# Patient Record
Sex: FEMALE | Race: WHITE
[De-identification: names, ages, dates, MRNs, and addresses within clinical notes are randomized per-mention and may not be internally consistent; named-entity substitution may affect disease eponyms.]

---

## 2017-04-26 ENCOUNTER — HOSPITAL ENCOUNTER (OUTPATIENT)
Dept: HOSPITAL 45 - C.ULTR | Age: 77
Discharge: HOME | End: 2017-04-26
Attending: SURGERY
Payer: COMMERCIAL

## 2017-04-26 DIAGNOSIS — K46.9: Primary | ICD-10-CM

## 2017-04-26 NOTE — DIAGNOSTIC IMAGING REPORT
ABDOMINAL WALL ULTRASOUND



CLINICAL HISTORY: SUSPECTED SPEGELIAN HERNIA    



COMPARISON STUDY:  No previous studies for comparison.



FINDINGS: Ultrasonographic evaluation of the right abdominal wall with attention

to the area of clinical concern was performed. This is performed both in the

supine and standing positions. No hernia is visualized. It should be noted that

CT scanning would be more sensitive for the detection of a spegelian hernia.



IMPRESSION:  No hernia is visualized ultrasonographically. 









Electronically signed by:  Herbert Hebert M.D.

4/26/2017 11:22 AM



Dictated Date/Time:  4/26/2017 11:21 AM

## 2017-05-05 ENCOUNTER — HOSPITAL ENCOUNTER (OUTPATIENT)
Dept: HOSPITAL 45 - C.CTS | Age: 77
Discharge: HOME | End: 2017-05-05
Attending: PHYSICIAN ASSISTANT
Payer: COMMERCIAL

## 2017-05-05 DIAGNOSIS — K46.9: Primary | ICD-10-CM

## 2017-05-05 NOTE — DIAGNOSTIC IMAGING REPORT
ABDOMEN AND PELVIS CT WITH IV AND ORAL CONTRAST



CT DOSE: 371.64 mGy.cm



HISTORY: Pain  K46.9 Hernia Possible spigelian hernia.[id number]



TECHNIQUE: Multiaxial CT images of the abdomen and pelvis were performed

following the use of intravenous and oral contrast.



COMPARISON STUDY: None.



FINDINGS: The lung bases are clear. The liver, spleen, gallbladder, pancreas,

kidneys, and adrenal glands are within normal limits. No bowel wall thickening

or obstruction. The pelvic organs are unremarkable. No suspicious lytic or

blastic osseous lesions.



IMPRESSION: 

No significant abnormality identified within the abdomen or pelvis. 







Electronically signed by:  Montrell Tucker M.D.

5/5/2017 2:57 PM



Dictated Date/Time:  5/5/2017 2:53 PM

## 2017-05-25 ENCOUNTER — HOSPITAL ENCOUNTER (OUTPATIENT)
Dept: HOSPITAL 45 - C.RAD | Age: 77
Discharge: HOME | End: 2017-05-25
Attending: NURSE PRACTITIONER
Payer: COMMERCIAL

## 2017-05-25 DIAGNOSIS — M25.651: Primary | ICD-10-CM

## 2017-05-25 DIAGNOSIS — M16.0: ICD-10-CM

## 2017-05-25 NOTE — DIAGNOSTIC IMAGING REPORT
LEFT HIP 2 VIEWS



HISTORY:      Left hip pain.



COMPARISON: None.



FINDINGS: There is no fracture or dislocation. Soft tissues are unremarkable.

Moderate to severe cartilage space narrowing within the left hip. There are

marginal osteophytes within the acetabulum and femoral head. There is associated

subchondral sclerosis.



IMPRESSION:  

Moderate to severe left hip osteoarthritis. No fractures.







Electronically signed by:  Kike Rivera M.D.

5/25/2017 2:58 PM



Dictated Date/Time:  5/25/2017 2:57 PM

## 2017-05-25 NOTE — DIAGNOSTIC IMAGING REPORT
RIGHT HIP UNILATERAL 2 VIEWS



CLINICAL HISTORY: Significantly decreased range of motion of both hips.    



COMPARISON: CT of the abdomen and pelvis May 5, 2017.



FINDINGS:  Alignment of the right hip is anatomic. There is moderate to severe

joint space narrowing of the right hip with extensive osteophytosis and

subchondral sclerosis. There is no evidence for avascular necrosis. There is no

fracture or suspicious lesion.



IMPRESSION: Severe osteoarthritis of the right hip.







Electronically signed by:  Perez Ramos M.D.

5/25/2017 2:57 PM



Dictated Date/Time:  5/25/2017 2:56 PM

## 2017-08-28 ENCOUNTER — HOSPITAL ENCOUNTER (OUTPATIENT)
Dept: HOSPITAL 45 - C.RADBC | Age: 77
Discharge: HOME | End: 2017-08-28
Attending: ORTHOPAEDIC SURGERY
Payer: COMMERCIAL

## 2017-08-28 DIAGNOSIS — M16.12: Primary | ICD-10-CM

## 2017-08-28 NOTE — DIAGNOSTIC IMAGING REPORT
FLUOROSCOPICALLY GUIDED INTRA-ARTICULAR LEFT HIP STEROID AND ANESTHETIC

INJECTION



CLINICAL HISTORY: Left hip and knee pain   



COMPARISON STUDY:  Outside radiograph dated 8/22/2017



FLUOROSCOPY TIME: 28 seconds.



NUMBER OF FLUOROSCOPIC IMAGES:  1



FINDINGS: A timeout was performed.



The risks the procedure were explained the patient informed consent was

obtained.



The patient was prepped and draped in sterile fashion.



The skin was anesthetized with 1% lidocaine.



Under fluoroscopic guidance, 20-gauge spinal needle was introduced into the

joint capsule left hip.



Intra-articular location was documented via the injection of Optiray 300.



2 cc of Celestone and 8 cc of Marcaine were instilled intra-articularly.



IMPRESSION:  

1. 2 cc of Celestone and 8 cc of Marcaine were intra-articular injected into the

left hip. There were no immediate complications .







Electronically signed by:  Herbert Hebert M.D.

8/28/2017 12:05 PM



Dictated Date/Time:  8/28/2017 12:03 PM

## 2017-09-28 LAB
ANION GAP SERPL CALC-SCNC: 8 MMOL/L (ref 3–11)
APPEARANCE UR: (no result)
BASOPHILS # BLD: 0.04 K/UL (ref 0–0.2)
BASOPHILS NFR BLD: 0.6 %
BILIRUB UR-MCNC: (no result) MG/DL
BUN SERPL-MCNC: 17 MG/DL (ref 7–18)
BUN/CREAT SERPL: 21.5 (ref 10–20)
CALCIUM SERPL-MCNC: 9.4 MG/DL (ref 8.5–10.1)
CHLORIDE SERPL-SCNC: 105 MMOL/L (ref 98–107)
CO2 SERPL-SCNC: 27 MMOL/L (ref 21–32)
COLOR UR: (no result)
COMPLETE: YES
CREAT CL PREDICTED SERPL C-G-VRATE: 68.9 ML/MIN
CREAT SERPL-MCNC: 0.81 MG/DL (ref 0.6–1.2)
EOSINOPHIL NFR BLD AUTO: 187 K/UL (ref 130–400)
GLUCOSE SERPL-MCNC: 103 MG/DL (ref 70–99)
HCT VFR BLD CALC: 42.2 % (ref 37–47)
IG%: 0.3 %
IMM GRANULOCYTES NFR BLD AUTO: 30.1 %
INR PPP: 1 (ref 0.9–1.1)
LYMPHOCYTES # BLD: 2.08 K/UL (ref 1.2–3.4)
MANUAL MICROSCOPIC REQUIRED?: NO
MCH RBC QN AUTO: 30.4 PG (ref 25–34)
MCHC RBC AUTO-ENTMCNC: 32.7 G/DL (ref 32–36)
MCV RBC AUTO: 93 FL (ref 80–100)
MONOCYTES NFR BLD: 9.8 %
NEUTROPHILS # BLD AUTO: 5.9 %
NEUTROPHILS NFR BLD AUTO: 53.3 %
NITRITE UR QL STRIP: (no result)
PARTIAL THROMBOPLASTIN RATIO: 1
PH UR STRIP: 5 [PH] (ref 4.5–7.5)
PMV BLD AUTO: 11.4 FL (ref 7.4–10.4)
POTASSIUM SERPL-SCNC: 4.1 MMOL/L (ref 3.5–5.1)
PROTHROMBIN TIME: 11 SECONDS (ref 9–12)
RBC # BLD AUTO: 4.54 M/UL (ref 4.2–5.4)
REVIEW REQ?: YES
SODIUM SERPL-SCNC: 140 MMOL/L (ref 136–145)
SP GR UR STRIP: 1.03 (ref 1–1.03)
URINE BILL WITH OR WITHOUT MIC: (no result)
URINE EPITHELIAL CELL AUTO: >30 /LPF (ref 0–5)
UROBILINOGEN UR-MCNC: (no result) MG/DL
WBC # BLD AUTO: 6.92 K/UL (ref 4.8–10.8)

## 2017-09-28 NOTE — PAT MEDICATION INSTRUCTIONS
Service Date


Sep 28, 2017.





Current Home Medication List


Estradiol (Estradiol), 0.5 MG PO QAM


Multivitamin (Multivitamin), 1 TAB PO DAILY





Medication Instructions


For Your Scheduled Surgery 





- Hold the following medications the morning of surgery:


Multivitamin (Multivitamin), 1 TAB PO DAILY





- Take the following medications the morning of surgery with a sip of water 

OTHERWISE NOTHING TO EAT OR DRINK AFTER MIDNIGHT:


Estradiol (Estradiol), 0.5 MG PO QAM











If you have any questions please call us at [phone] or [phone] or 

[phone]

## 2017-09-28 NOTE — DIAGNOSTIC IMAGING REPORT
CHEST 2 VIEWS ROUTINE



CLINICAL HISTORY: pat preoperative evaluation



COMPARISON STUDY:  6/17/2016



FINDINGS: The bones soft tissues and hemidiaphragms are normal. The

cardiomediastinal silhouette is normal. The lungs are clear. The pulmonary

vasculature is normal. 



IMPRESSION:  Negative chest. 











The above report was generated using voice recognition software.  It may contain

grammatical, syntax or spelling errors.









Electronically signed by:  Montrell Tucker M.D.

9/28/2017 2:37 PM



Dictated Date/Time:  9/28/2017 2:37 PM

## 2017-10-19 NOTE — HISTORY & PHYSICAL EXAMINATION
DATE OF ADMISSION:  10/20/2017

 

HISTORY AND PHYSICAL ADMISSION NOTE

 

CHIEF COMPLAINT:  Primary osteoarthritis of the left hip.

 

HISTORY OF PRESENT ILLNESS:  Virginia is a pleasant 77-year-old female who

presented to my office with complaints of left hip and groin pain.  X-rays

and clinical examination were diagnostic for primary osteoarthritis of the

left hip.  After failing extensive conservative treatment, she elected to

proceed with a left total hip arthroplasty.

 

PAST MEDICAL HISTORY:  Essentially denies.

 

PAST SURGICAL HISTORY:  Significant for an appendectomy 45 years ago,

hysterectomy 4 years ago and a hernia repair.

 

ALLERGIES:  PENICILLIN, ADVIL AND ALEVE.

 

MEDICATIONS:  Include estradiol 0.5 mg daily, and a women's multivitamin.

 

FAMILY HISTORY:  Noncontributory.

 

SOCIAL HISTORY:  She denies any tobacco, alcohol or IV drug use.  She remains

active.

 

REVIEW OF SYSTEMS:  She complains of left hip and groin pain.  All other

pertinent review of systems is negative.

 

PHYSICAL EXAMINATION:

GENERAL:  She is awake, alert and oriented x3.  She is in no apparent

distress.  She is very pleasant.

HEENT:  Pupils equal, round and reactive to light.  Extraocular motion

intact.  Oral mucosa is pink and moist.

HEART:  Regular rate per radial pulse.

LUNGS:  Miriam symmetrically bilaterally with no audible breath sounds.

ABDOMEN:  Soft, nontender, nondistended.

MUSCULOSKELETAL:  On physical examination of her left hip, she has difficulty

lying flat.  I can flex her up to about 90 degrees and she has significant

pain with forced internal and external rotation of her hip.  Her leg lengths

are essentially equal and she is neurovascularly intact.

 

IMAGING DATA:  X-rays of the left hip and pelvis do show advanced

osteoarthritis of the left hip with joint space narrowing and osteophyte

formation.

 

IMPRESSION:  Primary osteoarthritis of the left hip.

 

PLAN:  Will proceed with a Biomet taper lock left total hip arthroplasty. 

Postoperatively, she will be started on aspirin for DVT prophylaxis and kept

in the hospital for postoperative medical management.  She will likely be

discharged to [facility].

 

 

 

BARRIE

## 2017-10-20 ENCOUNTER — HOSPITAL ENCOUNTER (INPATIENT)
Dept: HOSPITAL 45 - C.ACU | Age: 77
LOS: 2 days | Discharge: NURSING, REHAB OR LONG-TERM CARE | DRG: 470 | End: 2017-10-22
Attending: ORTHOPAEDIC SURGERY | Admitting: ORTHOPAEDIC SURGERY
Payer: COMMERCIAL

## 2017-10-20 VITALS
HEIGHT: 69 IN | WEIGHT: 175.49 LBS | BODY MASS INDEX: 25.99 KG/M2 | HEIGHT: 69 IN | BODY MASS INDEX: 25.99 KG/M2 | BODY MASS INDEX: 25.99 KG/M2 | WEIGHT: 175.49 LBS

## 2017-10-20 VITALS
HEART RATE: 73 BPM | SYSTOLIC BLOOD PRESSURE: 127 MMHG | TEMPERATURE: 97.52 F | OXYGEN SATURATION: 99 % | DIASTOLIC BLOOD PRESSURE: 72 MMHG

## 2017-10-20 VITALS — SYSTOLIC BLOOD PRESSURE: 132 MMHG | DIASTOLIC BLOOD PRESSURE: 81 MMHG | HEART RATE: 72 BPM | OXYGEN SATURATION: 100 %

## 2017-10-20 VITALS
HEART RATE: 71 BPM | DIASTOLIC BLOOD PRESSURE: 80 MMHG | SYSTOLIC BLOOD PRESSURE: 138 MMHG | OXYGEN SATURATION: 100 % | TEMPERATURE: 97.52 F

## 2017-10-20 VITALS
TEMPERATURE: 97.52 F | OXYGEN SATURATION: 99 % | DIASTOLIC BLOOD PRESSURE: 79 MMHG | SYSTOLIC BLOOD PRESSURE: 134 MMHG | HEART RATE: 66 BPM

## 2017-10-20 VITALS
OXYGEN SATURATION: 99 % | DIASTOLIC BLOOD PRESSURE: 80 MMHG | SYSTOLIC BLOOD PRESSURE: 130 MMHG | TEMPERATURE: 94.46 F | HEART RATE: 72 BPM

## 2017-10-20 VITALS
DIASTOLIC BLOOD PRESSURE: 63 MMHG | HEART RATE: 78 BPM | SYSTOLIC BLOOD PRESSURE: 107 MMHG | TEMPERATURE: 97.7 F | OXYGEN SATURATION: 97 %

## 2017-10-20 VITALS
SYSTOLIC BLOOD PRESSURE: 136 MMHG | OXYGEN SATURATION: 99 % | HEART RATE: 70 BPM | DIASTOLIC BLOOD PRESSURE: 85 MMHG | TEMPERATURE: 97.52 F

## 2017-10-20 VITALS
HEART RATE: 73 BPM | OXYGEN SATURATION: 98 % | DIASTOLIC BLOOD PRESSURE: 74 MMHG | TEMPERATURE: 97.7 F | SYSTOLIC BLOOD PRESSURE: 123 MMHG

## 2017-10-20 VITALS — DIASTOLIC BLOOD PRESSURE: 76 MMHG | HEART RATE: 68 BPM | SYSTOLIC BLOOD PRESSURE: 124 MMHG | OXYGEN SATURATION: 98 %

## 2017-10-20 VITALS
SYSTOLIC BLOOD PRESSURE: 119 MMHG | OXYGEN SATURATION: 96 % | TEMPERATURE: 98.06 F | DIASTOLIC BLOOD PRESSURE: 84 MMHG | HEART RATE: 68 BPM

## 2017-10-20 DIAGNOSIS — Z88.0: ICD-10-CM

## 2017-10-20 DIAGNOSIS — M16.12: Primary | ICD-10-CM

## 2017-10-20 LAB — HCT VFR BLD CALC: 30 % (ref 37–47)

## 2017-10-20 PROCEDURE — 0SRB04Z REPLACEMENT OF LEFT HIP JOINT WITH CERAMIC ON POLYETHYLENE SYNTHETIC SUBSTITUTE, OPEN APPROACH: ICD-10-PCS | Performed by: ORTHOPAEDIC SURGERY

## 2017-10-20 RX ADMIN — Medication SCH MG: at 21:18

## 2017-10-20 RX ADMIN — DOCUSATE SODIUM SCH MG: 100 CAPSULE, LIQUID FILLED ORAL at 21:18

## 2017-10-20 RX ADMIN — STANDARDIZED SENNA CONCENTRATE SCH MG: 8.6 TABLET ORAL at 21:18

## 2017-10-20 RX ADMIN — SODIUM CHLORIDE SCH MLS/HR: 900 INJECTION, SOLUTION INTRAVENOUS at 21:18

## 2017-10-20 RX ADMIN — TRANEXAMIC ACID SCH MLS/HR: 100 INJECTION, SOLUTION INTRAVENOUS at 06:30

## 2017-10-20 RX ADMIN — TRANEXAMIC ACID SCH MLS/HR: 100 INJECTION, SOLUTION INTRAVENOUS at 06:53

## 2017-10-20 RX ADMIN — SODIUM CHLORIDE SCH MLS/HR: 900 INJECTION, SOLUTION INTRAVENOUS at 13:10

## 2017-10-20 RX ADMIN — ACETAMINOPHEN SCH MLS/HR: 10 INJECTION, SOLUTION INTRAVENOUS at 15:21

## 2017-10-20 RX ADMIN — ACETAMINOPHEN SCH MLS/HR: 10 INJECTION, SOLUTION INTRAVENOUS at 23:58

## 2017-10-20 NOTE — OPERATIVE REPORT
DATE OF OPERATION:  10/20/2017

 

PREOPERATIVE DIAGNOSIS:  Primary osteoarthritis of the left hip.

 

POSTOPERATIVE DIAGNOSIS:  Same.

 

PROCEDURE:  Left total hip arthroplasty.

 

SURGEON:  Dr. Zachariah Cole.

 

ASSISTANT:  Charlie Ulloa PA-C, whose assistance was necessary for positioning

of the leg and helping with retraction and closure.

 

ANESTHESIA:  Spinal.

 

COMPLICATIONS:  None.

 

CONDITION:  Stable to PACU.

 

IMPLANTS USED:  I used a Biomet Taperloc total hip arthroplasty system with a

size 50 G7 cup with a single 30-mm screw, a 50 G7 E1 neutral E-poly liner,

size 12 high offset Taperloc stem and a 36 ceramic head with a -6 neck.

 

INDICATIONS:  Virginia is a pleasant 77-year-old female who presented to my

office with complaints of left groin and left knee pain.  X-rays of the left

hip and the knee showed severe osteoarthritis of the left hip and no

arthritis of the left knee.  She elected to proceed with a left total hip

arthroplasty.

 

DESCRIPTION OF PROCEDURE:  On 10/20/2017, she arrived at [facility] for the above procedure.  She was seen in the preoperative holding

area and the operative extremity was identified and signed.  She was given a

preoperative antibiotic and a spinal anesthetic.  She was taken back to the

operating room, laid on the table in the supine position, and given basic

sedation.  The left hip was then brought out to a Purist leg positioner.  The

left hip was then prepped and draped in sterile fashion.  Time-out was done

and the patient and operative extremity was properly identified.

 

An anterior approach was used.  Dissection was taken down through the tensor

and the tensor muscle was retracted laterally and the rectus was retracted

medially.  The capsule was then incised and tagged for later repair.  The

femoral neck was then resected and the femoral head was removed.  The

acetabulum was exposed.  Time was spent doing a labral release.  Sequential

reaming up to a size 49 reamer was done.  Reaming was done under fluoroscopy

to ensure appropriate version.  A size 50 pressfit cup was then impacted into

place.  A single 30-mm screw was placed and the E1 neutral poly liner was

then snapped into place.  The proximal femur was then exposed.  Sequential

broaching up to a size 13 broach was done.  A standard head and neck assembly

was applied and the hip was reduced.  I was happy with the overall fit and

length.  The broach was removed.  A size 13 implant was then impacted into

place; however, I was unable to impact the final implant as far as I was able

to get that broach,  I felt I was going to split calcar if I kept going on

any further.  Decision was made to downsize to a size 12.  A femoral stem was

wasted and a size 12 Taperloc stem was then impacted into place.  I was very

happy with the fit of the smaller stem.  Several different heads were trialed 
and a -6

seemed to be the best fit.  The hip was reduced and fluoroscopic images

showed anatomic alignment and restoration of leg lengths.  The surrounding

soft tissues were injected with 100 mL of an orthopedic pain control

cocktail.  The knee was then irrigated with 3 liters of normal saline

solution with bacitracin.  The capsule was then closed with #1 Vicryl and the

drain was placed.  Fascia was closed with #1 PDS and skin was closed with 2-0

Vicryl, 3-0 V-Loc suture and staples.  She was then placed on a hospital bed

and taken to the postanesthesia care unit in stable condition.  She tolerated

the procedure well.

 

 

I attest to the content of the Intraoperative Record and any orders documented 
therein. Any exceptions are noted below.

 

 

 

BARRIE

## 2017-10-20 NOTE — DIAGNOSTIC IMAGING REPORT
L PELVIS/UNILATERAL HIP 1 VIEW



HISTORY:  77 years-old Female IN PACU - A/P PELVIS and LATERAL HIP INCLUDING ALL

OF IMPLANT status post left hip arthroplasty



COMPARISON:     left hip radiographs of same day at 7:22 AM



TECHNIQUE: AP view the pelvis with frog-leg view of the left hip



FINDINGS: 

Status post left hip arthroplasty with satisfactory alignment. Expected soft

tissue swelling and deep tissue air is noted about the left hip. Skin staples

are present with surgical drain. Moderate right hip and pubic symphysis

degenerative changes. No acute fracture or dislocation.



IMPRESSION: Status post left total hip arthroplasty without complication. 







The above report was generated using voice recognition software. It may contain

grammatical, syntax or spelling errors.







Electronically signed by:  Gianfranco Jacobs M.D.

10/20/2017 12:34 PM



Dictated Date/Time:  10/20/2017 10:22 AM

## 2017-10-20 NOTE — [facility] POST OPERATIVE BRIEF NOTE
Immediate Operative Summary


Operative Date


Oct 20, 2017.





Pre-Operative Diagnosis





Primary osteoarthritis of the left hip





Post-Operative Diagnosis





Primary osteoarthritis of the left hip





Procedure(s) Performed





Left Anterior Total Hip Arthroplasty





Surgeon


Dr. Zachariah Cole





Assistant Surgeon(s)


Jonel Ulloa PA-C





Estimated Blood Loss


300cc





Findings


as above





Specimens





A. Left femoral head





Complication(s)


None





Disposition


Recovery Room / PACU

## 2017-10-20 NOTE — DIAGNOSTIC IMAGING REPORT
L HIP UNILATERAL 1 VIEW



HISTORY:  77 years-old Female LT ANTERIOR TOTAL status post left hip

arthroplasty



COMPARISON: Pelvis and left hip radiographs of same day



TECHNIQUE: 4 spot fluoroscopic images of the left hip were obtained utilizing

79.1 seconds fluoroscopy time



FINDINGS: 

First image demonstrates acetabular component of left hip arthroplasty in

satisfactory position with a single cannulated screw noted projected superiorly.

There has been surgical resection of the femoral head and neck. Second through

fourth images demonstrate the femoral stem of the arthroplasty in place with

satisfactory alignment. No periprosthetic fracture identified. Expected soft

tissue swelling and deep tissue air about the left hip.



IMPRESSION: Fluoroscopic assistance as above. Please see operative report for

further details. 







The above report was generated using voice recognition software. It may contain

grammatical, syntax or spelling errors.







Electronically signed by:  Gianfranco Jacobs M.D.

10/20/2017 12:51 PM



Dictated Date/Time:  10/20/2017 12:50 PM

## 2017-10-20 NOTE — ANESTHESIOLOGY PROGRESS NOTE
Anesthesia Post Op Note


Date & Time


Oct 20, 2017 at 10:55





Vital Signs


Pain Intensity:  0





Vital Signs Past 12 Hours








  Date Time  Temp Pulse Resp B/P (MAP) Pulse Ox O2 Delivery O2 Flow Rate FiO2


 


10/20/17 10:45    124/70    


 


10/20/17 10:43  72 21  100   


 


10/20/17 10:43  72 21     


 


10/20/17 10:41    116/67    


 


10/20/17 10:38  71 16  100   


 


10/20/17 10:38  72 16     


 


10/20/17 10:36    121/68    


 


10/20/17 10:33  69 19  100   


 


10/20/17 10:33  69 19     


 


10/20/17 10:31    123/68    


 


10/20/17 10:28  69 18     


 


10/20/17 10:28  69 18  100   


 


10/20/17 10:27  66 19  100   


 


10/20/17 10:27  66 19     


 


10/20/17 10:26    115/65    


 


10/20/17 10:22  68 18     


 


10/20/17 10:22  68 18  100   


 


10/20/17 10:21    119/66    


 


10/20/17 10:17  69 20     


 


10/20/17 10:17  71 20  100   


 


10/20/17 10:16    120/58    


 


10/20/17 10:13  64 18  100   


 


10/20/17 10:13  64 18     


 


10/20/17 10:11    117/68    


 


10/20/17 10:08  63 24  100   


 


10/20/17 10:08  64 24     


 


10/20/17 10:06    96/63    


 


10/20/17 10:03  64 23     


 


10/20/17 10:03  63 23  100   


 


10/20/17 10:01    113/64    


 


10/20/17 09:58  64 17     


 


10/20/17 09:58  84 17  100   


 


10/20/17 09:56    113/64    


 


10/20/17 09:53   23     


 


10/20/17 09:53 36.4 73 20 123/70 97 Nasal Cannula 2 


 


10/20/17 09:53  70 23 123/70    


 


10/20/17 05:51 36.7 68 18 119/84 96 Room Air  











Notes


Mental Status:  alert / awake / arousable, participated in evaluation


Pt Amnestic to Procedure:  Yes


Nausea / Vomiting:  adequately controlled


Pain:  adequately controlled


Airway Patency, RR, SpO2:  stable & adequate


BP & HR:  stable & adequate


Hydration State:  stable & adequate


Anesthetic Complications:  no major complications apparent

## 2017-10-21 VITALS
SYSTOLIC BLOOD PRESSURE: 118 MMHG | TEMPERATURE: 97.7 F | HEART RATE: 72 BPM | OXYGEN SATURATION: 96 % | DIASTOLIC BLOOD PRESSURE: 67 MMHG

## 2017-10-21 VITALS
OXYGEN SATURATION: 98 % | TEMPERATURE: 98.24 F | DIASTOLIC BLOOD PRESSURE: 56 MMHG | HEART RATE: 76 BPM | SYSTOLIC BLOOD PRESSURE: 100 MMHG

## 2017-10-21 VITALS
OXYGEN SATURATION: 98 % | SYSTOLIC BLOOD PRESSURE: 123 MMHG | HEART RATE: 81 BPM | DIASTOLIC BLOOD PRESSURE: 56 MMHG | TEMPERATURE: 98.24 F

## 2017-10-21 VITALS
TEMPERATURE: 98.78 F | HEART RATE: 77 BPM | SYSTOLIC BLOOD PRESSURE: 109 MMHG | DIASTOLIC BLOOD PRESSURE: 58 MMHG | OXYGEN SATURATION: 96 %

## 2017-10-21 VITALS
SYSTOLIC BLOOD PRESSURE: 103 MMHG | HEART RATE: 78 BPM | DIASTOLIC BLOOD PRESSURE: 58 MMHG | TEMPERATURE: 98.06 F | OXYGEN SATURATION: 96 %

## 2017-10-21 LAB
ANION GAP SERPL CALC-SCNC: 5 MMOL/L (ref 3–11)
BASOPHILS # BLD: 0.03 K/UL (ref 0–0.2)
BASOPHILS NFR BLD: 0.3 %
BUN SERPL-MCNC: 11 MG/DL (ref 7–18)
BUN/CREAT SERPL: 18.3 (ref 10–20)
CALCIUM SERPL-MCNC: 8.2 MG/DL (ref 8.5–10.1)
CHLORIDE SERPL-SCNC: 110 MMOL/L (ref 98–107)
CO2 SERPL-SCNC: 26 MMOL/L (ref 21–32)
COMPLETE: YES
CREAT CL PREDICTED SERPL C-G-VRATE: 91.5 ML/MIN
CREAT SERPL-MCNC: 0.6 MG/DL (ref 0.6–1.2)
EOSINOPHIL NFR BLD AUTO: 129 K/UL (ref 130–400)
GLUCOSE SERPL-MCNC: 105 MG/DL (ref 70–99)
HCT VFR BLD CALC: 30.8 % (ref 37–47)
IG%: 0.3 %
IMM GRANULOCYTES NFR BLD AUTO: 13.9 %
LYMPHOCYTES # BLD: 1.65 K/UL (ref 1.2–3.4)
MCH RBC QN AUTO: 29.6 PG (ref 25–34)
MCHC RBC AUTO-ENTMCNC: 32.1 G/DL (ref 32–36)
MCV RBC AUTO: 91.9 FL (ref 80–100)
MONOCYTES NFR BLD: 8.3 %
NEUTROPHILS # BLD AUTO: 0.5 %
NEUTROPHILS NFR BLD AUTO: 76.7 %
PMV BLD AUTO: 11.4 FL (ref 7.4–10.4)
POTASSIUM SERPL-SCNC: 3.9 MMOL/L (ref 3.5–5.1)
RBC # BLD AUTO: 3.35 M/UL (ref 4.2–5.4)
SODIUM SERPL-SCNC: 141 MMOL/L (ref 136–145)
WBC # BLD AUTO: 11.91 K/UL (ref 4.8–10.8)

## 2017-10-21 RX ADMIN — Medication SCH TAB: at 09:08

## 2017-10-21 RX ADMIN — ESTRADIOL SCH MG: 1 TABLET ORAL at 09:08

## 2017-10-21 RX ADMIN — ACETAMINOPHEN SCH MLS/HR: 10 INJECTION, SOLUTION INTRAVENOUS at 09:08

## 2017-10-21 RX ADMIN — DOCUSATE SODIUM SCH MG: 100 CAPSULE, LIQUID FILLED ORAL at 09:08

## 2017-10-21 RX ADMIN — Medication SCH MG: at 21:13

## 2017-10-21 RX ADMIN — Medication SCH MG: at 09:08

## 2017-10-21 RX ADMIN — PANTOPRAZOLE SCH MG: 40 TABLET, DELAYED RELEASE ORAL at 09:09

## 2017-10-21 RX ADMIN — STANDARDIZED SENNA CONCENTRATE SCH MG: 8.6 TABLET ORAL at 21:00

## 2017-10-21 RX ADMIN — ACETAMINOPHEN SCH MG: 500 TABLET, COATED ORAL at 21:13

## 2017-10-21 RX ADMIN — SODIUM CHLORIDE SCH MLS/HR: 900 INJECTION, SOLUTION INTRAVENOUS at 07:08

## 2017-10-21 RX ADMIN — DOCUSATE SODIUM SCH MG: 100 CAPSULE, LIQUID FILLED ORAL at 21:00

## 2017-10-21 NOTE — PROGRESS NOTE
DATE: 10/21/2017

 

CHIEF COMPLAINT:  Status post left total hip arthroplasty, postop day #1 in

progress.

 

SUBJECTION:  Virginia was seen and examined at bedside today.  Overall, she

is doing very well.  She said she has a very little pain in her left hip. 

She was up and ambulating last night and this morning.  She has no

complaints.

 

PHYSICAL EXAMINATION:

LEFT HIP:  The dressing is clean and dry and the drain is to suction.  Her

leg lengths are essentially equal.  She has good sensation on her quads.  She

has active dorsiflexion and plantarflexion of her left ankle.

 

LABORATORY DATA:  She has an H&H today of 9.9 and _____.  Her glucose is 105.

 Her vital signs are all stable on room air.  She is voiding on her own.

 

X-rays postoperatively of the left hip showed the prosthesis to be in

anatomical alignment without any evidence of fracture, dislocation or

loosening.

 

IMPRESSION:  Status post left total hip arthroplasty, postop day #1.

 

PLAN:  At this point, she is doing very well and happy with the progress. 

She is on aspirin for DVT prophylaxis.  Therapy will get her up and have her

ambulating more today.  Tomorrow morning, the nursing staff can change the

dressing, pull the drain and she is looking for discharge to [facility]

[facility].

## 2017-10-22 VITALS
DIASTOLIC BLOOD PRESSURE: 67 MMHG | SYSTOLIC BLOOD PRESSURE: 142 MMHG | TEMPERATURE: 98.24 F | OXYGEN SATURATION: 92 % | HEART RATE: 86 BPM

## 2017-10-22 VITALS
HEART RATE: 86 BPM | OXYGEN SATURATION: 92 % | SYSTOLIC BLOOD PRESSURE: 142 MMHG | DIASTOLIC BLOOD PRESSURE: 67 MMHG | TEMPERATURE: 98.24 F

## 2017-10-22 VITALS — SYSTOLIC BLOOD PRESSURE: 142 MMHG | OXYGEN SATURATION: 92 % | HEART RATE: 86 BPM | DIASTOLIC BLOOD PRESSURE: 67 MMHG

## 2017-10-22 RX ADMIN — Medication SCH TAB: at 08:32

## 2017-10-22 RX ADMIN — ACETAMINOPHEN SCH MG: 500 TABLET, COATED ORAL at 14:21

## 2017-10-22 RX ADMIN — Medication SCH MG: at 08:33

## 2017-10-22 RX ADMIN — ESTRADIOL SCH MG: 1 TABLET ORAL at 08:33

## 2017-10-22 RX ADMIN — PANTOPRAZOLE SCH MG: 40 TABLET, DELAYED RELEASE ORAL at 08:32

## 2017-10-22 RX ADMIN — ACETAMINOPHEN SCH MG: 500 TABLET, COATED ORAL at 05:23

## 2017-10-22 RX ADMIN — DOCUSATE SODIUM SCH MG: 100 CAPSULE, LIQUID FILLED ORAL at 08:32

## 2017-10-22 NOTE — PROGRESS NOTE
DATE: 10/22/2017

 

CHIEF COMPLAINT:  Status post left total hip arthroplasty, postop day #2.

 

PROGRESS:  Virginia was seen and examined at bedside today.  Overall, she is

doing very well.  She just finished with physical therapy.  She has minimal

pain in her left hip.  She has no complaints.

 

PHYSICAL EXAMINATION:

LEFT HIP:  The dressing has been changed and drain has been pulled.  The

incision is clean and dry.  She has active dorsiflexion and plantarflexion of

her left ankle.  Her leg lengths are equal.

 

IMPRESSION:  Status post left total hip arthroplasty, postop day #2.

 

PLAN:  At this point, she is doing well.  She is on aspirin for DVT

prophylaxis.  Her pain is well controlled.  She is orthopedically stable for

discharge to [facility] later today.

## 2017-10-23 NOTE — DISCHARGE SUMMARY
DISCHARGE DIAGNOSIS:  Primary osteoarthritis of the left hip.

 

PROCEDURE:  Left total hip arthroplasty on 10/20/2017 by Dr. Zachariah Cole.

 

DISCHARGE INSTRUCTIONS:

1.  Oxycodone 5-10 mg every 4 hours as needed for pain.

2.  Aspirin 325 mg twice a day for DVT prophylaxis.

3.  ANATOLIY hose stockings for 6 weeks.

4.  Follow up with Dr. Cole in 2 weeks.

5.  Call the office of Dr. Cole with any questions or concerns.

 

HOSPITAL COURSE:  Virginia is a very pleasant 77-year-old female who

presented to my office with complaints of chronic left hip and knee pain. 

X-rays and clinical examination were diagnostic for primary osteoarthritis of

the left hip.  After failing conservative treatment, she elected to undergo a

left total hip arthroplasty.  On 10/20/2017 she arrived at [facility] and underwent a left hip replacement without complications.  She had

a spinal anesthetic.  Postoperatively, she was discharged to general

orthopedic floors.  Her hospital course was uneventful.  On postop day #1,

her H&H was stable at 9.9 and 30.8.  She was up and ambulating well with

physical therapy.  She did not have much pain in the hip.  On postop day #2,

the nursing staff changed the dressing and pulled the drain.  She continued

to do very well.  Her pain was controlled and she was ambulating well with

physical therapy.  She was subsequently discharged to [facility] with the above instructions.

## 2018-05-12 ENCOUNTER — HOSPITAL ENCOUNTER (EMERGENCY)
Dept: HOSPITAL 45 - C.EDB | Age: 78
Discharge: HOME | End: 2018-05-12
Payer: COMMERCIAL

## 2018-05-12 VITALS
BODY MASS INDEX: 27.95 KG/M2 | WEIGHT: 188.72 LBS | WEIGHT: 188.72 LBS | HEIGHT: 69.02 IN | BODY MASS INDEX: 27.95 KG/M2 | HEIGHT: 69.02 IN

## 2018-05-12 VITALS — DIASTOLIC BLOOD PRESSURE: 63 MMHG | SYSTOLIC BLOOD PRESSURE: 124 MMHG | HEART RATE: 70 BPM | OXYGEN SATURATION: 95 %

## 2018-05-12 VITALS — TEMPERATURE: 97.7 F

## 2018-05-12 DIAGNOSIS — Z98.818: ICD-10-CM

## 2018-05-12 DIAGNOSIS — Z79.899: ICD-10-CM

## 2018-05-12 DIAGNOSIS — M16.12: ICD-10-CM

## 2018-05-12 DIAGNOSIS — K11.20: Primary | ICD-10-CM

## 2018-05-12 DIAGNOSIS — Z88.0: ICD-10-CM

## 2018-05-12 DIAGNOSIS — Z79.82: ICD-10-CM

## 2018-05-12 DIAGNOSIS — Z90.710: ICD-10-CM

## 2018-05-12 DIAGNOSIS — Z86.19: ICD-10-CM

## 2018-05-12 DIAGNOSIS — Z88.8: ICD-10-CM

## 2018-05-12 LAB
BASOPHILS # BLD: 0.05 K/UL (ref 0–0.2)
BASOPHILS NFR BLD: 0.6 %
BUN SERPL-MCNC: 16 MG/DL (ref 7–18)
CALCIUM SERPL-MCNC: 8.7 MG/DL (ref 8.5–10.1)
CO2 SERPL-SCNC: 28 MMOL/L (ref 21–32)
CREAT SERPL-MCNC: 0.84 MG/DL (ref 0.6–1.2)
EOS ABS #: 0.39 K/UL (ref 0–0.5)
EOSINOPHIL NFR BLD AUTO: 175 K/UL (ref 130–400)
GLUCOSE SERPL-MCNC: 97 MG/DL (ref 70–99)
HCT VFR BLD CALC: 41.2 % (ref 37–47)
HGB BLD-MCNC: 13.9 G/DL (ref 12–16)
IG#: 0.02 K/UL (ref 0–0.02)
IMM GRANULOCYTES NFR BLD AUTO: 22.5 %
LYMPHOCYTES # BLD: 1.84 K/UL (ref 1.2–3.4)
MCH RBC QN AUTO: 30.6 PG (ref 25–34)
MCHC RBC AUTO-ENTMCNC: 33.7 G/DL (ref 32–36)
MCV RBC AUTO: 90.7 FL (ref 80–100)
MONO ABS #: 0.88 K/UL (ref 0.11–0.59)
MONOCYTES NFR BLD: 10.8 %
NEUT ABS #: 4.98 K/UL (ref 1.4–6.5)
NEUTROPHILS # BLD AUTO: 4.8 %
NEUTROPHILS NFR BLD AUTO: 61.1 %
PMV BLD AUTO: 10.9 FL (ref 7.4–10.4)
POTASSIUM SERPL-SCNC: 4 MMOL/L (ref 3.5–5.1)
RED CELL DISTRIBUTION WIDTH CV: 12.9 % (ref 11.5–14.5)
RED CELL DISTRIBUTION WIDTH SD: 43.2 FL (ref 36.4–46.3)
SODIUM SERPL-SCNC: 139 MMOL/L (ref 136–145)
WBC # BLD AUTO: 8.16 K/UL (ref 4.8–10.8)

## 2018-05-12 NOTE — DIAGNOSTIC IMAGING REPORT
SOFT TISSUE NECK WITH



HISTORY:  77 years-old Female Right facial pain, lower jaw swelling acute

right-sided facial pain and swelling



COMPARISON: None available



TECHNIQUE: Multiple axial CT images of the soft tissues of the neck were

obtained following the intravenous administration of 93 mL Optiray 320 IV

contrast. A dose lowering technique was used consistent with the principals of

RENETTA.



FINDINGS: 

Study is limited secondary to streak artifact from dental amalgam hardware. The

right parotid gland is asymmetrically larger than the left demonstrates moderate

degree of interstitial and periglandular inflammatory stranding. Mild

inflammatory stranding tracks along the right  space and right

platysma musculature. The left parotid gland, submandibular and sublingual

glands appear unremarkable. Note is of parotid ductal dilation or obstructing

sialolith. No drainable fluid collection or opaque foreign body. No pathologic

adenopathy.



Calcifications of the lingual tonsils. Airway is patent. The glottis and

subglottic airway appears unremarkable. 4 mm low attenuating left thyroid

nodule. Atherosclerosis of the bilateral carotid bulbs. No acute intracranial

abnormality identified. 4 mm solid nodule of the left upper lobe, image 393

series 4. Mild biapical pleural-parenchymal scarring. Mastoid air cells are

clear. Minimal mucosal thickening about the ethmoid air cells and maxillary

sinuses. Multilevel degenerative changes about the cervical spine with kyphotic

curvature of the mid cervical spine.



IMPRESSION: 

1. Moderate enlargement with interstitial and periglandular inflammatory

stranding involving the right parotid gland is compatible with acute parotiditis

without ductal dilation or obstructing sialolith identified.

2. No pathologic adenopathy or drainable fluid collection.

3. Additional findings as above.







The above report was generated using voice recognition software. It may contain

grammatical, syntax or spelling errors.







Electronically signed by:  Gianfranco Jacobs M.D.

5/12/2018 2:08 PM



Dictated Date/Time:  5/12/2018 1:59 PM

## 2018-05-12 NOTE — EMERGENCY ROOM VISIT NOTE
History


Report prepared by Musa:  Otoniel Maradiaga


Under the Supervision of:  Dr. Norberto Mack M.D.


First contact with patient:  12:04


Chief Complaint:  FACIAL PAIN/INJURY


Stated Complaint:  SWOLLEN JAW,EAR,TONGUE,NECK





History of Present Illness


The patient is a 77 year old female with a history of a breast cyst who 

presents to the Emergency Room with complaints of persistent right-sided facial 

pain that started yesterday. She states that she noticed right-sided facial 

swelling after lunch yesterday, and she describes it as a "big egg of swelling"

. The patient notes that overnight the swelling moved down the right side of 

her face and into her neck. The patient says that she feels swelling in her jaw

, ear, tongue, and neck. She adds that she has right-sided facial pain which 

she rates as a 7 out of 10 in severity. The patient says that "everything hurts 

on the right side from the eyeball down to the bottom of the neck". She states 

that she took 1 Aspirin last night, and felt feverish last night.  The patient 

says that she was unable to sleep last night due to the pain. She went to [place] earlier today, and was referred here due to the swelling. The patient 

was afebrile at the office. She adds that she had her right lower molars done 

in a dentist office 3 weeks ago. Pt denies LOC, headache, diaphoresis, visual 

changes, chest pain, breathing difficulties, nausea, vomiting, abdominal pain, 

back pain, melena, hematochezia, urinary symptoms, numbness, weakness, rash, or 

other complaints.





   Source of History:  patient


   Onset:  Yesterday


   Position:  other (right side of face)


   Symptom Intensity:  7/10 pain


   Quality:  other (swelling)


   Timing:  other (persistent)


   Associated Symptoms:  + fevers ("felt feverish last night"), + neck pain


Note:


No other associated symptoms noted.





Review of Systems


See HPI for pertinent positives and negatives.  A total of ten systems were 

reviewed and were otherwise negative.





Past Medical & Surgical


Medical Problems:


(1) Breast cyst


(2) Lyme disease


(3) Osteoarthritis of left hip


Surgical Problems:


(1) History of appendectomy


(2) History of hysterectomy


(3) S/P hernia repair








Family History





Patient reports no known family medical history.





Social History


Smoking Status:  Never Smoker


Smokeless Tobacco Use:  No


Alcohol Use:  none


Marital Status:  


Housing Status:  lives alone





Current/Historical Medications


Scheduled


Aspirin (Aspirin Ec), 81 MG PO DAILY


Clindamycin Hcl (Cleocin), 150 MG PO QID


Estradiol (Estradiol), 0.5 MG PO QAM


Multivitamin (Multivitamin), 1 TAB PO DAILY





Allergies


Coded Allergies:  


     Penicillins (Verified  Allergy, Unknown, HIVES, 5/12/18)


     NSAIDs (Unverified  Adverse Reaction, Unknown, BLURRED VISION-ALEVE,ADVIL, 

5/12/18)





Physical Exam


Vital Signs











  Date Time  Temp Pulse Resp B/P (MAP) Pulse Ox O2 Delivery O2 Flow Rate FiO2


 


5/12/18 14:56  70 16 124/63 95 Room Air  


 


5/12/18 11:45 36.5 78 18 128/78 96 Room Air  











Physical Exam


GENERAL: Awake, alert, well-appearing, in no distress


HENT: Normocephalic, atraumatic. Tenderness over the right lower jaw, swelling 

present there. Mild right submandibular swelling. Tongue and floor of mouth are 

normal.


EYES: Normal conjunctiva. Sclera non-icteric.


NECK: Tenderness right side of neck. Supple. No nuchal rigidity. FROM. No 

masses.


RESPIRATORY: Clear to auscultation. No wheezes. No rales.  Normal respiratory 

effort.


CARDIAC: Normal rate.  Normal rhythm. No murmurs.  No rubs. Extremities warm 

and well perfused. Pulses equal.  No JVD.


GI: Soft, non-distended. No tenderness to palpation. No rebound or guarding. No 

masses.


RECTAL: Deferred.


MUSCULOSKELETAL: Atraumatic. Chest examination reveals no tenderness. The back 

is symmetrical on inspection without obvious abnormality. There is no CVA 

tenderness to palpation. No joint edema. 


LOWER EXTREMITIES: Calves are equal size bilaterally and non-tender. No edema. 

No discoloration. 


NEURO: Normal sensorium. No sensory or motor deficits noted. 


SKIN: No rash or jaundice noted.





Medical Decision & Procedures


ER Provider


Diagnostic Interpretation:


CT: Radiology results as stated below per my review and radiologist 

interpretation








SOFT TISSUE NECK WITH





HISTORY:  77 years-old Female Right facial pain, lower jaw swelling acute


right-sided facial pain and swelling





COMPARISON: None available





TECHNIQUE: Multiple axial CT images of the soft tissues of the neck were


obtained following the intravenous administration of 93 mL Optiray 320 IV


contrast. A dose lowering technique was used consistent with the principals of


ALARA.





FINDINGS: 


Study is limited secondary to streak artifact from dental amalgam hardware. The


right parotid gland is asymmetrically larger than the left demonstrates moderate


degree of interstitial and periglandular inflammatory stranding. Mild


inflammatory stranding tracks along the right  space and right


platysma musculature. The left parotid gland, submandibular and sublingual


glands appear unremarkable. Note is of parotid ductal dilation or obstructing


sialolith. No drainable fluid collection or opaque foreign body. No pathologic


adenopathy.





Calcifications of the lingual tonsils. Airway is patent. The glottis and


subglottic airway appears unremarkable. 4 mm low attenuating left thyroid


nodule. Atherosclerosis of the bilateral carotid bulbs. No acute intracranial


abnormality identified. 4 mm solid nodule of the left upper lobe, image 393


series 4. Mild biapical pleural-parenchymal scarring. Mastoid air cells are


clear. Minimal mucosal thickening about the ethmoid air cells and maxillary


sinuses. Multilevel degenerative changes about the cervical spine with kyphotic


curvature of the mid cervical spine.





IMPRESSION: 


1. Moderate enlargement with interstitial and periglandular inflammatory


stranding involving the right parotid gland is compatible with acute parotiditis


without ductal dilation or obstructing sialolith identified.


2. No pathologic adenopathy or drainable fluid collection.


3. Additional findings as above.











The above report was generated using voice recognition software. It may contain


grammatical, syntax or spelling errors.











Electronically signed by:  Gianfranco Jacobs M.D.


5/12/2018 2:08 PM





Dictated Date/Time:  5/12/2018 1:59 PM





Laboratory Results


5/12/18 12:32








Red Blood Count 4.54, Mean Corpuscular Volume 90.7, Mean Corpuscular Hemoglobin 

30.6, Mean Corpuscular Hemoglobin Concent 33.7, Mean Platelet Volume 10.9, 

Neutrophils (%) (Auto) 61.1, Lymphocytes (%) (Auto) 22.5, Monocytes (%) (Auto) 

10.8, Eosinophils (%) (Auto) 4.8, Basophils (%) (Auto) 0.6, Neutrophils # (Auto

) 4.98, Lymphocytes # (Auto) 1.84, Monocytes # (Auto) 0.88, Eosinophils # (Auto

) 0.39, Basophils # (Auto) 0.05





5/12/18 12:32

















Test


  5/12/18


12:32


 


White Blood Count


  8.16 K/uL


(4.8-10.8)


 


Red Blood Count


  4.54 M/uL


(4.2-5.4)


 


Hemoglobin


  13.9 g/dL


(12.0-16.0)


 


Hematocrit 41.2 % (37-47) 


 


Mean Corpuscular Volume


  90.7 fL


()


 


Mean Corpuscular Hemoglobin


  30.6 pg


(25-34)


 


Mean Corpuscular Hemoglobin


Concent 33.7 g/dl


(32-36)


 


Platelet Count


  175 K/uL


(130-400)


 


Mean Platelet Volume


  10.9 fL


(7.4-10.4)


 


Neutrophils (%) (Auto) 61.1 % 


 


Lymphocytes (%) (Auto) 22.5 % 


 


Monocytes (%) (Auto) 10.8 % 


 


Eosinophils (%) (Auto) 4.8 % 


 


Basophils (%) (Auto) 0.6 % 


 


Neutrophils # (Auto)


  4.98 K/uL


(1.4-6.5)


 


Lymphocytes # (Auto)


  1.84 K/uL


(1.2-3.4)


 


Monocytes # (Auto)


  0.88 K/uL


(0.11-0.59)


 


Eosinophils # (Auto)


  0.39 K/uL


(0-0.5)


 


Basophils # (Auto)


  0.05 K/uL


(0-0.2)


 


RDW Standard Deviation


  43.2 fL


(36.4-46.3)


 


RDW Coefficient of Variation


  12.9 %


(11.5-14.5)


 


Immature Granulocyte % (Auto) 0.2 % 


 


Immature Granulocyte # (Auto)


  0.02 K/uL


(0.00-0.02)


 


Anion Gap


  5.0 mmol/L


(3-11)


 


Est Creatinine Clear Calc


Drug Dose 67.6 ml/min 


 


 


Estimated GFR (


American) 78.8 


 


 


Estimated GFR (Non-


American 68.0 


 


 


BUN/Creatinine Ratio 18.9 (10-20) 


 


Calcium Level


  8.7 mg/dl


(8.5-10.1)





Laboratory results reviewed by me





Medications Administered











 Medications


  (Trade)  Dose


 Ordered  Sig/Darryl


 Route  Start Time


 Stop Time Status Last Admin


Dose Admin


 


 Clindamycin HCl


  (Cleocin Cap)  150 mg  ONE  ONCE


 PO  5/12/18 14:45


 5/12/18 14:46 DC 5/12/18 15:02


150 MG


 


 Acetaminophen


  (Tylenol Tab)  1,000 mg  NOW  STAT


 PO  5/12/18 14:41


 5/12/18 14:42 DC 5/12/18 15:02


1,000 MG











ED Course


1208: Past medical records reviewed.





1209: The patient was evaluated in room B10. A complete history and physical 

exam was performed.





1437: I reevaluated the patient and she is doing well. Discussed results and 

discharge instructions: she verbalized understanding and agreement. The patient 

is ready for discharge. 





1441: Tylenol Tab 1000 mg PO.





1445: Cleocin Cap 150 mg PO.





Medical Decision


Prior records/ancillary studies reviewed.





Triage Nursing notes reviewed and agree them.








The patient's history was concerning for fever, chills, and right facial 

swelling.





Differential diagnosis:


Etiologies such as parotitis, sialoadenitis, lumps, mononucleosis, 

streptococcal pharyngitis, peritonsillar abscess, viral syndrome, 

retropharyngeal abscess, tonsillitis, otitis,  pneumonia, influenza, as well as 

others were entertained.  





ER treatment provided:


Patient declined analgesia.  She drove herself to the ER.


On reassessment the patient felt better.





Diagnostics interpreted by me:








The labs revealed an unremarkable CBC and chemistry panel.





Imaging studies:


CT scan as above





The patient has parotitis.  She is doing well at this point time.  Blood work 

is unremarkable.  There is no evidence of separate parotitis.  She will be 

treated with oral clindamycin, warm compresses, and sialagogues.  She will have 

a follow-up with her primary physician.  If she worsens in any way she will be 

back.  I gave my usual and customary discussion regarding this issue.





By the evaluation outlined above other emergent etiologies such as those listed 

in the differential, as well as others, were deemed relatively unlikely.  





The patient was educated about the findings as listed above.  All questions 

were answered and the patient was pleased with the treatment.  Return 

instructions were outlined and the patient was discharged in stable condition.  





The patient was referred to her PCP for follow-up for a recheck of the current 

condition.





Medication Reconcilliation


Current Medication List:  was personally reviewed by me





Blood Pressure Screening


Patient's blood pressure:  Normal blood pressure





Impression





 Primary Impression:  


 Parotitis





Scribe Attestation


The scribe's documentation has been prepared under my direction and personally 

reviewed by me in its entirety. I confirm that the note above accurately 

reflects all work, treatment, procedures, and medical decision making performed 

by me.





Departure Information


Dispostion


Home / Self-Care





Prescriptions





Clindamycin Hcl (CLEOCIN) 150 Mg Cap


150 MG PO QID, #39 CAP


   Prov: Norberto Mack MD         5/12/18





Referrals


Brenda Shelton (PCP)





Patient Instructions


ED Submandibular Gland Infec, My [facility]





Additional Instructions





Clindamycin 150mg:  Take two pills four times daily for 10 days for your 

infection.  All antibiotics can cause diarrhea.  If this occurs and you feel 

worse or it does not resolve in 1-2 days follow up with your doctor or return 

to the Emergency Department as this could be signs of serious underlying 

problems.  Any medication can cause an allergic reaction, stop the pills 

immediately and return to the ER for rash, hives, breathing difficulties, or 

swelling.





Tylenol: Take 1000 mg every 6 hours as needed for pain.  Do not take more than 

3000 mg in a 24 hour period.





Warm compresses for 20 minutes at a time four times daily for 2-3 days.





Sour candy every 2-3 hours to promote salivary production.





Return to the ER immediately for spreading redness, fevers, pus-like drainage, 

severe pain, or as needed.  





Follow-up with your primary care physician in 2 to 3 days for a recheck of your 

current condition.

## 2018-07-30 ENCOUNTER — HOSPITAL ENCOUNTER (OUTPATIENT)
Dept: HOSPITAL 45 - C.MAMM | Age: 78
Discharge: HOME | End: 2018-07-30
Attending: NURSE PRACTITIONER
Payer: COMMERCIAL

## 2018-07-30 DIAGNOSIS — Z12.31: Primary | ICD-10-CM

## 2018-07-31 NOTE — MAMMOGRAPHY REPORT
BILATERAL DIGITAL SCREENING MAMMOGRAM TOMOSYNTHESIS WITH CAD: 7/30/2018

CLINICAL HISTORY: Routine screening. Patient has no complaints.  





TECHNIQUE: The study was acquired using full field digital technology and interpreted from soft copy.
 Breast tomosynthesis in addition to standard 2D mammography was performed. Current study was also ev
aluated with a Computer Aided Detection (CAD) system.  



COMPARISON: Comparison is made to exams dated:  10/15/2012 ultrasound, 10/15/2012 mammogram, 5/19/201
1 mammogram - [facility], 6/5/2009, and 6/4/2008.   

BREAST COMPOSITION: There are scattered areas of fibroglandular density in both breasts.  



FINDINGS: There are scattered benign rim calcifications and minimal vascular calcification in the prateek
asts.  Intramammary lymph nodes in each upper outer quadrant. No suspicious mass, architectural disto
rtion or cluster of microcalcifications is seen.  



IMPRESSION: ACR BI-RADS CATEGORY 1: NEGATIVE

There is no mammographic evidence of malignancy. A 1 year screening mammogram is recommended.(07/31/2
019)  The patient will receive written notification of the results.  





Some breast cancers are not detected with mammography. A negative mammographic report should not jose elias
y biopsy if a clinically suggestive mass is present.



Araceli Hutchins M.D.          

ay/:7/30/2018 16:21:27  



Imaging Technologist: Virginia Thakur, [facility]

letter sent: Normal 1/2  

BI-RADS Code: ACR BI-RADS Category 1: Negative